# Patient Record
Sex: MALE | Race: ASIAN | NOT HISPANIC OR LATINO
[De-identification: names, ages, dates, MRNs, and addresses within clinical notes are randomized per-mention and may not be internally consistent; named-entity substitution may affect disease eponyms.]

---

## 2021-12-20 ENCOUNTER — APPOINTMENT (OUTPATIENT)
Dept: INTERNAL MEDICINE | Facility: CLINIC | Age: 21
End: 2021-12-20
Payer: COMMERCIAL

## 2021-12-20 ENCOUNTER — TRANSCRIPTION ENCOUNTER (OUTPATIENT)
Age: 21
End: 2021-12-20

## 2021-12-20 VITALS
SYSTOLIC BLOOD PRESSURE: 110 MMHG | TEMPERATURE: 98.2 F | HEIGHT: 68 IN | WEIGHT: 171 LBS | DIASTOLIC BLOOD PRESSURE: 80 MMHG | HEART RATE: 99 BPM | OXYGEN SATURATION: 98 % | BODY MASS INDEX: 25.91 KG/M2

## 2021-12-20 DIAGNOSIS — Z82.49 FAMILY HISTORY OF ISCHEMIC HEART DISEASE AND OTHER DISEASES OF THE CIRCULATORY SYSTEM: ICD-10-CM

## 2021-12-20 PROCEDURE — 99385 PREV VISIT NEW AGE 18-39: CPT | Mod: 25

## 2021-12-20 NOTE — PHYSICAL EXAM
[No Acute Distress] : no acute distress [Well Nourished] : well nourished [Well Developed] : well developed [Well-Appearing] : well-appearing [Normal Sclera/Conjunctiva] : normal sclera/conjunctiva [PERRL] : pupils equal round and reactive to light [Normal Outer Ear/Nose] : the outer ears and nose were normal in appearance [Normal TMs] : both tympanic membranes were normal [No JVD] : no jugular venous distention [No Lymphadenopathy] : no lymphadenopathy [Supple] : supple [Thyroid Normal, No Nodules] : the thyroid was normal and there were no nodules present [No Respiratory Distress] : no respiratory distress  [No Accessory Muscle Use] : no accessory muscle use [Clear to Auscultation] : lungs were clear to auscultation bilaterally [Normal Rate] : normal rate  [Regular Rhythm] : with a regular rhythm [Normal S1, S2] : normal S1 and S2 [No Murmur] : no murmur heard [No Edema] : there was no peripheral edema [Soft] : abdomen soft [Non Tender] : non-tender [Non-distended] : non-distended [No Masses] : no abdominal mass palpated [Normal Bowel Sounds] : normal bowel sounds [Normal Supraclavicular Nodes] : no supraclavicular lymphadenopathy [Normal Axillary Nodes] : no axillary lymphadenopathy [Normal Posterior Cervical Nodes] : no posterior cervical lymphadenopathy [Normal Anterior Cervical Nodes] : no anterior cervical lymphadenopathy [Normal Inguinal Nodes] : no inguinal lymphadenopathy [Grossly Normal Strength/Tone] : grossly normal strength/tone [No Focal Deficits] : no focal deficits [Normal Gait] : normal gait [Normal Affect] : the affect was normal [Normal Insight/Judgement] : insight and judgment were intact

## 2021-12-20 NOTE — HISTORY OF PRESENT ILLNESS
[FreeTextEntry1] : CPE [de-identified] : vaccine- pt will bring in vaccination rec. pt getting COVID booster today. \par pt felt dizzy today when his labs were being drawn- resolved after a few min- pt was given juice\par diet-  trying to improve.  reviewed healthy diet\par exercise- walking.\par no sexually active.  no testicular pain\par

## 2021-12-20 NOTE — HEALTH RISK ASSESSMENT
[Never] : Never [Never (0 pts)] : Never (0 points) [No] : In the past 12 months have you used drugs other than those required for medical reasons? No [0] : 2) Feeling down, depressed, or hopeless: Not at all (0) [PHQ-2 Negative - No further assessment needed] : PHQ-2 Negative - No further assessment needed [HIV test declined] : HIV test declined [Hepatitis C test declined] : Hepatitis C test declined [With Patient/Caregiver] : , with patient/caregiver [Designated Healthcare Proxy] : Designated healthcare proxy [Relationship: ___] : Relationship: [unfilled] [Audit-CScore] : 0 [[id number]] : 0 [de-identified] : wear glasses- 1 yr [de-identified] : seen dentist this yr [AdvancecareDate] : 12/21

## 2021-12-21 LAB
ALBUMIN SERPL ELPH-MCNC: 4.9 G/DL
ALP BLD-CCNC: 86 U/L
ALT SERPL-CCNC: 31 U/L
ANION GAP SERPL CALC-SCNC: 15 MMOL/L
AST SERPL-CCNC: 23 U/L
BASOPHILS # BLD AUTO: 0.05 K/UL
BASOPHILS NFR BLD AUTO: 0.8 %
BILIRUB SERPL-MCNC: 0.3 MG/DL
BUN SERPL-MCNC: 12 MG/DL
CALCIUM SERPL-MCNC: 10.1 MG/DL
CHLORIDE SERPL-SCNC: 100 MMOL/L
CHOLEST SERPL-MCNC: 198 MG/DL
CO2 SERPL-SCNC: 26 MMOL/L
CREAT SERPL-MCNC: 0.89 MG/DL
EOSINOPHIL # BLD AUTO: 0.13 K/UL
EOSINOPHIL NFR BLD AUTO: 2.1 %
GLUCOSE SERPL-MCNC: 93 MG/DL
HCT VFR BLD CALC: 51.1 %
HDLC SERPL-MCNC: 62 MG/DL
HGB BLD-MCNC: 16.8 G/DL
IMM GRANULOCYTES NFR BLD AUTO: 0.3 %
LDLC SERPL CALC-MCNC: 105 MG/DL
LDLC SERPL DIRECT ASSAY-MCNC: 111 MG/DL
LYMPHOCYTES # BLD AUTO: 2.11 K/UL
LYMPHOCYTES NFR BLD AUTO: 34.7 %
MAN DIFF?: NORMAL
MCHC RBC-ENTMCNC: 28.1 PG
MCHC RBC-ENTMCNC: 32.9 GM/DL
MCV RBC AUTO: 85.5 FL
MONOCYTES # BLD AUTO: 0.49 K/UL
MONOCYTES NFR BLD AUTO: 8.1 %
NEUTROPHILS # BLD AUTO: 3.28 K/UL
NEUTROPHILS NFR BLD AUTO: 54 %
NONHDLC SERPL-MCNC: 136 MG/DL
PLATELET # BLD AUTO: 341 K/UL
POTASSIUM SERPL-SCNC: 4.5 MMOL/L
PROT SERPL-MCNC: 8.2 G/DL
RBC # BLD: 5.98 M/UL
RBC # FLD: 13 %
SODIUM SERPL-SCNC: 141 MMOL/L
T4 FREE SERPL-MCNC: 1.4 NG/DL
TRIGL SERPL-MCNC: 156 MG/DL
TSH SERPL-ACNC: 2.27 UIU/ML
WBC # FLD AUTO: 6.08 K/UL

## 2022-12-23 ENCOUNTER — APPOINTMENT (OUTPATIENT)
Dept: INTERNAL MEDICINE | Facility: CLINIC | Age: 22
End: 2022-12-23

## 2022-12-23 ENCOUNTER — LABORATORY RESULT (OUTPATIENT)
Age: 22
End: 2022-12-23

## 2022-12-23 ENCOUNTER — RESULT REVIEW (OUTPATIENT)
Age: 22
End: 2022-12-23

## 2022-12-23 VITALS
HEART RATE: 89 BPM | OXYGEN SATURATION: 98 % | DIASTOLIC BLOOD PRESSURE: 80 MMHG | HEIGHT: 67 IN | BODY MASS INDEX: 27.31 KG/M2 | SYSTOLIC BLOOD PRESSURE: 114 MMHG | WEIGHT: 174 LBS

## 2022-12-23 DIAGNOSIS — Z23 ENCOUNTER FOR IMMUNIZATION: ICD-10-CM

## 2022-12-23 PROCEDURE — 90686 IIV4 VACC NO PRSV 0.5 ML IM: CPT

## 2022-12-23 PROCEDURE — G0008: CPT

## 2022-12-23 PROCEDURE — 99395 PREV VISIT EST AGE 18-39: CPT | Mod: 25

## 2022-12-23 NOTE — PLAN
[FreeTextEntry1] : flu vac given\par pt to release vac records\par back pain- NSAID w food for 2 wk. heat, Rehab

## 2022-12-23 NOTE — HEALTH RISK ASSESSMENT
[Never] : Never [Never (0 pts)] : Never (0 points) [No] : In the past 12 months have you used drugs other than those required for medical reasons? No [0] : 2) Feeling down, depressed, or hopeless: Not at all (0) [PHQ-2 Negative - No further assessment needed] : PHQ-2 Negative - No further assessment needed [HIV test declined] : HIV test declined [Hepatitis C test declined] : Hepatitis C test declined [With Patient/Caregiver] : , with patient/caregiver [Designated Healthcare Proxy] : Designated healthcare proxy [Relationship: ___] : Relationship: [unfilled] [Audit-CScore] : 0 [[id number]] : 0 [de-identified] : wear glasses-  seen optom a few mo ago .  was ref ophth- a few wk [de-identified] : seen dentist this yr [AdvancecareDate] : 12/22

## 2022-12-23 NOTE — PHYSICAL EXAM
[No Acute Distress] : no acute distress [Well Nourished] : well nourished [Well Developed] : well developed [Well-Appearing] : well-appearing [Normal Sclera/Conjunctiva] : normal sclera/conjunctiva [PERRL] : pupils equal round and reactive to light [Normal Outer Ear/Nose] : the outer ears and nose were normal in appearance [Normal TMs] : both tympanic membranes were normal [No Lymphadenopathy] : no lymphadenopathy [Supple] : supple [Thyroid Normal, No Nodules] : the thyroid was normal and there were no nodules present [No Respiratory Distress] : no respiratory distress  [No Accessory Muscle Use] : no accessory muscle use [Clear to Auscultation] : lungs were clear to auscultation bilaterally [Normal Rate] : normal rate  [Regular Rhythm] : with a regular rhythm [Normal S1, S2] : normal S1 and S2 [No Murmur] : no murmur heard [No Edema] : there was no peripheral edema [Soft] : abdomen soft [Non Tender] : non-tender [Non-distended] : non-distended [No Masses] : no abdominal mass palpated [Normal Bowel Sounds] : normal bowel sounds [Normal Supraclavicular Nodes] : no supraclavicular lymphadenopathy [Normal Axillary Nodes] : no axillary lymphadenopathy [Normal Posterior Cervical Nodes] : no posterior cervical lymphadenopathy [Normal Anterior Cervical Nodes] : no anterior cervical lymphadenopathy [Normal Inguinal Nodes] : no inguinal lymphadenopathy [Grossly Normal Strength/Tone] : grossly normal strength/tone [No Focal Deficits] : no focal deficits [Normal Gait] : normal gait [Normal Affect] : the affect was normal [Normal Insight/Judgement] : insight and judgment were intact [de-identified] : good ROM of spine- no pain.  pt states area of his pain is over the L gluteal area [de-identified] : MS 5/5/ L Ext-.  good ROM of L hip- no pain [de-identified] : stg leg raise nl.    Double O-Z Flap Text: The defect edges were debeveled with a #15 scalpel blade.  Given the location of the defect, shape of the defect and the proximity to free margins a Double O-Z flap was deemed most appropriate.  Using a sterile surgical marker, an appropriate transposition flap was drawn incorporating the defect and placing the expected incisions within the relaxed skin tension lines where possible. The area thus outlined was incised deep to adipose tissue with a #15 scalpel blade.  The skin margins were undermined to an appropriate distance in all directions utilizing iris scissors.

## 2022-12-23 NOTE — HISTORY OF PRESENT ILLNESS
[FreeTextEntry1] : CPE [de-identified] : vaccine- pt will bring in vaccination rec. flu , tdap.  COVID- got vaccine 12/2022\par diet-  trying to improve.  reviewed healthy diet\par exercise- walking.\par no sexually active.  no testicular pain\par 1 yr ago- slipped and since then pain lower back and radiates down the leg L leg when sitting.  better on standing.  no weakness or numbness in the leg. no wgt loss or fever or urine or stool incont. when pain is severe- pt takes aleve\par

## 2022-12-27 LAB
ALBUMIN SERPL ELPH-MCNC: 4.9 G/DL
ALP BLD-CCNC: 68 U/L
ALT SERPL-CCNC: 31 U/L
ANION GAP SERPL CALC-SCNC: 13 MMOL/L
AST SERPL-CCNC: 23 U/L
BASOPHILS # BLD AUTO: 0.02 K/UL
BASOPHILS NFR BLD AUTO: 0.4 %
BILIRUB SERPL-MCNC: 0.7 MG/DL
BUN SERPL-MCNC: 14 MG/DL
CALCIUM SERPL-MCNC: 10.3 MG/DL
CHLORIDE SERPL-SCNC: 101 MMOL/L
CHOLEST SERPL-MCNC: 210 MG/DL
CO2 SERPL-SCNC: 25 MMOL/L
CREAT SERPL-MCNC: 0.89 MG/DL
EGFR: 125 ML/MIN/1.73M2
EOSINOPHIL # BLD AUTO: 0.1 K/UL
EOSINOPHIL NFR BLD AUTO: 2.1 %
GLUCOSE SERPL-MCNC: 97 MG/DL
HCT VFR BLD CALC: 46.8 %
HDLC SERPL-MCNC: 61 MG/DL
HGB BLD-MCNC: 16.2 G/DL
IMM GRANULOCYTES NFR BLD AUTO: 0.4 %
LDLC SERPL CALC-MCNC: 122 MG/DL
LDLC SERPL DIRECT ASSAY-MCNC: 116 MG/DL
LYMPHOCYTES # BLD AUTO: 1.7 K/UL
LYMPHOCYTES NFR BLD AUTO: 35.8 %
MAN DIFF?: NORMAL
MCHC RBC-ENTMCNC: 28.8 PG
MCHC RBC-ENTMCNC: 34.6 GM/DL
MCV RBC AUTO: 83.3 FL
MONOCYTES # BLD AUTO: 0.41 K/UL
MONOCYTES NFR BLD AUTO: 8.6 %
NEUTROPHILS # BLD AUTO: 2.5 K/UL
NEUTROPHILS NFR BLD AUTO: 52.7 %
NONHDLC SERPL-MCNC: 149 MG/DL
PLATELET # BLD AUTO: 294 K/UL
POTASSIUM SERPL-SCNC: 4.2 MMOL/L
PROT SERPL-MCNC: 8.1 G/DL
RBC # BLD: 5.62 M/UL
RBC # FLD: 13.2 %
SODIUM SERPL-SCNC: 139 MMOL/L
T4 FREE SERPL-MCNC: 1.4 NG/DL
TRIGL SERPL-MCNC: 131 MG/DL
TSH SERPL-ACNC: 1.34 UIU/ML
WBC # FLD AUTO: 4.75 K/UL

## 2023-01-05 ENCOUNTER — APPOINTMENT (OUTPATIENT)
Dept: RADIOLOGY | Facility: CLINIC | Age: 23
End: 2023-01-05
Payer: COMMERCIAL

## 2023-01-05 PROCEDURE — 73502 X-RAY EXAM HIP UNI 2-3 VIEWS: CPT | Mod: LT

## 2023-01-05 PROCEDURE — 72110 X-RAY EXAM L-2 SPINE 4/>VWS: CPT

## 2024-01-01 ENCOUNTER — NON-APPOINTMENT (OUTPATIENT)
Age: 24
End: 2024-01-01

## 2024-01-05 ENCOUNTER — APPOINTMENT (OUTPATIENT)
Dept: INTERNAL MEDICINE | Facility: CLINIC | Age: 24
End: 2024-01-05
Payer: COMMERCIAL

## 2024-01-05 VITALS
SYSTOLIC BLOOD PRESSURE: 116 MMHG | OXYGEN SATURATION: 99 % | TEMPERATURE: 97.3 F | DIASTOLIC BLOOD PRESSURE: 79 MMHG | HEIGHT: 67 IN | WEIGHT: 191 LBS | BODY MASS INDEX: 29.98 KG/M2 | HEART RATE: 87 BPM

## 2024-01-05 DIAGNOSIS — Z00.00 ENCOUNTER FOR GENERAL ADULT MEDICAL EXAMINATION W/OUT ABNORMAL FINDINGS: ICD-10-CM

## 2024-01-05 DIAGNOSIS — E66.3 OVERWEIGHT: ICD-10-CM

## 2024-01-05 LAB
ALBUMIN SERPL ELPH-MCNC: 4.8 G/DL
ALP BLD-CCNC: 84 U/L
ALT SERPL-CCNC: 45 U/L
ANION GAP SERPL CALC-SCNC: 12 MMOL/L
AST SERPL-CCNC: 24 U/L
BASOPHILS # BLD AUTO: 0.03 K/UL
BASOPHILS NFR BLD AUTO: 0.5 %
BILIRUB SERPL-MCNC: 0.6 MG/DL
BUN SERPL-MCNC: 15 MG/DL
CALCIUM SERPL-MCNC: 9.8 MG/DL
CHLORIDE SERPL-SCNC: 102 MMOL/L
CHOLEST SERPL-MCNC: 213 MG/DL
CO2 SERPL-SCNC: 24 MMOL/L
CREAT SERPL-MCNC: 0.9 MG/DL
EGFR: 124 ML/MIN/1.73M2
EOSINOPHIL # BLD AUTO: 0.1 K/UL
EOSINOPHIL NFR BLD AUTO: 1.6 %
ESTIMATED AVERAGE GLUCOSE: 111 MG/DL
GLUCOSE SERPL-MCNC: 94 MG/DL
HBA1C MFR BLD HPLC: 5.5 %
HCT VFR BLD CALC: 48.3 %
HDLC SERPL-MCNC: 62 MG/DL
HGB BLD-MCNC: 16 G/DL
IMM GRANULOCYTES NFR BLD AUTO: 0.3 %
LDLC SERPL CALC-MCNC: 127 MG/DL
LDLC SERPL DIRECT ASSAY-MCNC: 131 MG/DL
LYMPHOCYTES # BLD AUTO: 2.06 K/UL
LYMPHOCYTES NFR BLD AUTO: 32.5 %
MAN DIFF?: NORMAL
MCHC RBC-ENTMCNC: 28.1 PG
MCHC RBC-ENTMCNC: 33.1 GM/DL
MCV RBC AUTO: 84.7 FL
MONOCYTES # BLD AUTO: 0.49 K/UL
MONOCYTES NFR BLD AUTO: 7.7 %
NEUTROPHILS # BLD AUTO: 3.64 K/UL
NEUTROPHILS NFR BLD AUTO: 57.4 %
NONHDLC SERPL-MCNC: 152 MG/DL
PLATELET # BLD AUTO: 328 K/UL
POTASSIUM SERPL-SCNC: 4 MMOL/L
PROT SERPL-MCNC: 8 G/DL
RBC # BLD: 5.7 M/UL
RBC # FLD: 13.3 %
SODIUM SERPL-SCNC: 138 MMOL/L
T4 FREE SERPL-MCNC: 1.4 NG/DL
TRIGL SERPL-MCNC: 142 MG/DL
TSH SERPL-ACNC: 2.09 UIU/ML
WBC # FLD AUTO: 6.34 K/UL

## 2024-01-05 PROCEDURE — 36415 COLL VENOUS BLD VENIPUNCTURE: CPT

## 2024-01-05 PROCEDURE — 99395 PREV VISIT EST AGE 18-39: CPT | Mod: 25

## 2024-01-05 NOTE — PLAN
[FreeTextEntry1] : pt to release vac rec overweight- gained wgt- diet , exercise discussed. Blood was collected in the office.  addendum- spoke to pt - clumped PLT- rpt w [facility]

## 2024-01-05 NOTE — HEALTH RISK ASSESSMENT
[Never (0 pts)] : Never (0 points) [No] : In the past 12 months have you used drugs other than those required for medical reasons? No [0] : 2) Feeling down, depressed, or hopeless: Not at all (0) [PHQ-2 Negative - No further assessment needed] : PHQ-2 Negative - No further assessment needed [HIV test declined] : HIV test declined [Hepatitis C test declined] : Hepatitis C test declined [With Patient/Caregiver] : , with patient/caregiver [Designated Healthcare Proxy] : Designated healthcare proxy [Relationship: ___] : Relationship: [unfilled] [Never] : Never [Audit-CScore] : 0 [[id number]] : 0 [de-identified] : wear glasses-  seen optom  this yr .   [de-identified] : seen dentist this yr- 08/18/23 [AdvancecareDate] : 01/23

## 2024-01-05 NOTE — HISTORY OF PRESENT ILLNESS
[FreeTextEntry1] : CPE [de-identified] : vaccine- pt will bring in vaccination rec. flu , tdap. fluc, COVID- got vaccine 2023 diet-  trying to improve.  reviewed healthy diet exercise- walking., indoor bike no sexually active.  no testicular pain 2 yr ago- slipped and since then pain lower back and rarely radiates down the leg L leg when sitting.   reviewed 1/5/23 xray hip, back-nl

## 2024-01-05 NOTE — PHYSICAL EXAM
[No Acute Distress] : no acute distress [Well Nourished] : well nourished [Well Developed] : well developed [Well-Appearing] : well-appearing [Normal Sclera/Conjunctiva] : normal sclera/conjunctiva [PERRL] : pupils equal round and reactive to light [Normal Outer Ear/Nose] : the outer ears and nose were normal in appearance [Normal TMs] : both tympanic membranes were normal [No Lymphadenopathy] : no lymphadenopathy [Supple] : supple [Thyroid Normal, No Nodules] : the thyroid was normal and there were no nodules present [No Respiratory Distress] : no respiratory distress  [No Accessory Muscle Use] : no accessory muscle use [Clear to Auscultation] : lungs were clear to auscultation bilaterally [Normal Rate] : normal rate  [Regular Rhythm] : with a regular rhythm [Normal S1, S2] : normal S1 and S2 [No Murmur] : no murmur heard [No Edema] : there was no peripheral edema [Soft] : abdomen soft [Non Tender] : non-tender [Non-distended] : non-distended [No Masses] : no abdominal mass palpated [Normal Bowel Sounds] : normal bowel sounds [Normal Supraclavicular Nodes] : no supraclavicular lymphadenopathy [Normal Axillary Nodes] : no axillary lymphadenopathy [Normal Posterior Cervical Nodes] : no posterior cervical lymphadenopathy [Normal Anterior Cervical Nodes] : no anterior cervical lymphadenopathy [Normal Inguinal Nodes] : no inguinal lymphadenopathy [Grossly Normal Strength/Tone] : grossly normal strength/tone [No Focal Deficits] : no focal deficits [Normal Gait] : normal gait [Normal Affect] : the affect was normal [Normal Insight/Judgement] : insight and judgment were intact [de-identified] : stg leg raise nl.

## 2024-02-22 ENCOUNTER — APPOINTMENT (OUTPATIENT)
Dept: MRI IMAGING | Facility: CLINIC | Age: 24
End: 2024-02-22
Payer: COMMERCIAL

## 2024-02-22 ENCOUNTER — OUTPATIENT (OUTPATIENT)
Dept: OUTPATIENT SERVICES | Facility: HOSPITAL | Age: 24
LOS: 1 days | End: 2024-02-22
Payer: COMMERCIAL

## 2024-02-22 DIAGNOSIS — Z00.8 ENCOUNTER FOR OTHER GENERAL EXAMINATION: ICD-10-CM

## 2024-02-22 PROCEDURE — 72148 MRI LUMBAR SPINE W/O DYE: CPT

## 2024-02-22 PROCEDURE — 72148 MRI LUMBAR SPINE W/O DYE: CPT | Mod: 26

## 2024-04-10 ENCOUNTER — APPOINTMENT (OUTPATIENT)
Dept: INTERNAL MEDICINE | Facility: CLINIC | Age: 24
End: 2024-04-10
Payer: COMMERCIAL

## 2024-04-10 VITALS
DIASTOLIC BLOOD PRESSURE: 80 MMHG | BODY MASS INDEX: 29.03 KG/M2 | SYSTOLIC BLOOD PRESSURE: 110 MMHG | OXYGEN SATURATION: 98 % | WEIGHT: 185 LBS | HEART RATE: 77 BPM | HEIGHT: 67 IN

## 2024-04-10 DIAGNOSIS — B36.0 PITYRIASIS VERSICOLOR: ICD-10-CM

## 2024-04-10 DIAGNOSIS — G89.29 DORSALGIA, UNSPECIFIED: ICD-10-CM

## 2024-04-10 DIAGNOSIS — M54.9 DORSALGIA, UNSPECIFIED: ICD-10-CM

## 2024-04-10 PROCEDURE — G2211 COMPLEX E/M VISIT ADD ON: CPT

## 2024-04-10 PROCEDURE — 99214 OFFICE O/P EST MOD 30 MIN: CPT

## 2024-04-10 RX ORDER — KETOCONAZOLE 20 MG/G
2 CREAM TOPICAL TWICE DAILY
Qty: 1 | Refills: 0 | Status: ACTIVE | COMMUNITY
Start: 2024-04-10 | End: 1900-01-01

## 2024-04-10 NOTE — HISTORY OF PRESENT ILLNESS
[de-identified] : vaccine- pt will bring in vaccination rec. flu , tdap. fluc, COVID- got vaccine 2023 diet-  trying to improve.  reviewed healthy diet exercise- walking., indoor bike 2 yr ago- slipped and since then pain lower back and rarely radiates down the leg L leg when sitting.   reviewed 1/5/23 xray hip, back-nl  reviewed 1/5/24 CBC nl. pt was seen by Spine / Rehab- Dr. Noyola and had MRI- pt states he had MRI which showed disc herniation. last episodes of radiation a few wks ago.  back pain is 50 % better.   pain if sitting for extended periods.  pt states the chair he sits on is good. Rarely takes aleve reviewed xray L spine, hip- nl

## 2024-04-10 NOTE — PHYSICAL EXAM
[de-identified] : hypopigmentation over back and lower abd [TextEntry] : Constitutional: no acute distress, well nourished, well developed and well-appearing. Pulmonary: no respiratory distress, lungs were clear to auscultation bilaterally, no accessory muscle use. Cardiac: normal rate, with a regular rhythm, normal S1 and S2 and no murmur heard.  Vascular: there was no peripheral edema. Abdomen: abdomen soft, non-tender, non-distended, no abdominal mass palpated and normal bowel sounds. Psychiatric: the affect was normal and insight and judgement were intact

## 2024-04-10 NOTE — PLAN
[FreeTextEntry1] : pt to release vac records tinea versicolor- explained how to use ketoconazole- f/u w Derm if not better. back pain-will get rec from Rehab

## 2025-02-10 ENCOUNTER — APPOINTMENT (OUTPATIENT)
Dept: INTERNAL MEDICINE | Facility: CLINIC | Age: 25
End: 2025-02-10
Payer: COMMERCIAL

## 2025-02-10 ENCOUNTER — APPOINTMENT (OUTPATIENT)
Dept: INTERNAL MEDICINE | Facility: CLINIC | Age: 25
End: 2025-02-10

## 2025-02-10 PROCEDURE — 36415 COLL VENOUS BLD VENIPUNCTURE: CPT

## 2025-02-21 ENCOUNTER — APPOINTMENT (OUTPATIENT)
Dept: INTERNAL MEDICINE | Facility: CLINIC | Age: 25
End: 2025-02-21
Payer: COMMERCIAL

## 2025-02-21 VITALS
HEART RATE: 78 BPM | BODY MASS INDEX: 29.19 KG/M2 | DIASTOLIC BLOOD PRESSURE: 78 MMHG | SYSTOLIC BLOOD PRESSURE: 120 MMHG | OXYGEN SATURATION: 94 % | WEIGHT: 186 LBS | HEIGHT: 67 IN

## 2025-02-21 DIAGNOSIS — E66.3 OVERWEIGHT: ICD-10-CM

## 2025-02-21 DIAGNOSIS — G89.29 DORSALGIA, UNSPECIFIED: ICD-10-CM

## 2025-02-21 DIAGNOSIS — M54.9 DORSALGIA, UNSPECIFIED: ICD-10-CM

## 2025-02-21 DIAGNOSIS — Z00.00 ENCOUNTER FOR GENERAL ADULT MEDICAL EXAMINATION W/OUT ABNORMAL FINDINGS: ICD-10-CM

## 2025-02-21 PROCEDURE — 99395 PREV VISIT EST AGE 18-39: CPT
